# Patient Record
Sex: MALE | Race: WHITE | NOT HISPANIC OR LATINO | Employment: FULL TIME | URBAN - METROPOLITAN AREA
[De-identification: names, ages, dates, MRNs, and addresses within clinical notes are randomized per-mention and may not be internally consistent; named-entity substitution may affect disease eponyms.]

---

## 2024-04-13 ENCOUNTER — APPOINTMENT (OUTPATIENT)
Dept: CARDIOLOGY | Facility: HOSPITAL | Age: 44
End: 2024-04-13
Payer: COMMERCIAL

## 2024-04-13 ENCOUNTER — APPOINTMENT (OUTPATIENT)
Dept: RADIOLOGY | Facility: HOSPITAL | Age: 44
End: 2024-04-13
Payer: COMMERCIAL

## 2024-04-13 ENCOUNTER — HOSPITAL ENCOUNTER (EMERGENCY)
Facility: HOSPITAL | Age: 44
Discharge: HOME | End: 2024-04-13
Attending: STUDENT IN AN ORGANIZED HEALTH CARE EDUCATION/TRAINING PROGRAM
Payer: COMMERCIAL

## 2024-04-13 VITALS
TEMPERATURE: 96.8 F | DIASTOLIC BLOOD PRESSURE: 98 MMHG | SYSTOLIC BLOOD PRESSURE: 140 MMHG | HEIGHT: 75 IN | OXYGEN SATURATION: 100 % | BODY MASS INDEX: 36.06 KG/M2 | HEART RATE: 79 BPM | WEIGHT: 290 LBS | RESPIRATION RATE: 18 BRPM

## 2024-04-13 DIAGNOSIS — R07.9 CHEST PAIN, UNSPECIFIED TYPE: Primary | ICD-10-CM

## 2024-04-13 LAB
ANION GAP SERPL CALC-SCNC: 12 MMOL/L
BASOPHILS # BLD AUTO: 0.07 X10*3/UL (ref 0–0.1)
BASOPHILS NFR BLD AUTO: 1.1 %
BUN SERPL-MCNC: 19 MG/DL (ref 8–25)
CALCIUM SERPL-MCNC: 8.9 MG/DL (ref 8.5–10.4)
CHLORIDE SERPL-SCNC: 100 MMOL/L (ref 97–107)
CO2 SERPL-SCNC: 27 MMOL/L (ref 24–31)
CREAT SERPL-MCNC: 1.4 MG/DL (ref 0.4–1.6)
EGFRCR SERPLBLD CKD-EPI 2021: 63 ML/MIN/1.73M*2
EOSINOPHIL # BLD AUTO: 0.42 X10*3/UL (ref 0–0.7)
EOSINOPHIL NFR BLD AUTO: 6.4 %
ERYTHROCYTE [DISTWIDTH] IN BLOOD BY AUTOMATED COUNT: 12.6 % (ref 11.5–14.5)
GLUCOSE SERPL-MCNC: 110 MG/DL (ref 65–99)
HCT VFR BLD AUTO: 44.5 % (ref 41–52)
HGB BLD-MCNC: 15.3 G/DL (ref 13.5–17.5)
IMM GRANULOCYTES # BLD AUTO: 0.04 X10*3/UL (ref 0–0.7)
IMM GRANULOCYTES NFR BLD AUTO: 0.6 % (ref 0–0.9)
LYMPHOCYTES # BLD AUTO: 2.03 X10*3/UL (ref 1.2–4.8)
LYMPHOCYTES NFR BLD AUTO: 30.9 %
MCH RBC QN AUTO: 29.3 PG (ref 26–34)
MCHC RBC AUTO-ENTMCNC: 34.4 G/DL (ref 32–36)
MCV RBC AUTO: 85 FL (ref 80–100)
MONOCYTES # BLD AUTO: 0.57 X10*3/UL (ref 0.1–1)
MONOCYTES NFR BLD AUTO: 8.7 %
NEUTROPHILS # BLD AUTO: 3.45 X10*3/UL (ref 1.2–7.7)
NEUTROPHILS NFR BLD AUTO: 52.3 %
NRBC BLD-RTO: 0 /100 WBCS (ref 0–0)
PLATELET # BLD AUTO: 247 X10*3/UL (ref 150–450)
POTASSIUM SERPL-SCNC: 3.7 MMOL/L (ref 3.4–5.1)
RBC # BLD AUTO: 5.22 X10*6/UL (ref 4.5–5.9)
SODIUM SERPL-SCNC: 139 MMOL/L (ref 133–145)
TROPONIN T SERPL-MCNC: 7 NG/L
WBC # BLD AUTO: 6.6 X10*3/UL (ref 4.4–11.3)

## 2024-04-13 PROCEDURE — 36415 COLL VENOUS BLD VENIPUNCTURE: CPT | Performed by: STUDENT IN AN ORGANIZED HEALTH CARE EDUCATION/TRAINING PROGRAM

## 2024-04-13 PROCEDURE — 99283 EMERGENCY DEPT VISIT LOW MDM: CPT | Mod: 25

## 2024-04-13 PROCEDURE — 84484 ASSAY OF TROPONIN QUANT: CPT | Performed by: STUDENT IN AN ORGANIZED HEALTH CARE EDUCATION/TRAINING PROGRAM

## 2024-04-13 PROCEDURE — 71045 X-RAY EXAM CHEST 1 VIEW: CPT

## 2024-04-13 PROCEDURE — 80048 BASIC METABOLIC PNL TOTAL CA: CPT | Performed by: STUDENT IN AN ORGANIZED HEALTH CARE EDUCATION/TRAINING PROGRAM

## 2024-04-13 PROCEDURE — 85025 COMPLETE CBC W/AUTO DIFF WBC: CPT | Performed by: STUDENT IN AN ORGANIZED HEALTH CARE EDUCATION/TRAINING PROGRAM

## 2024-04-13 PROCEDURE — 93005 ELECTROCARDIOGRAM TRACING: CPT

## 2024-04-13 PROCEDURE — 71045 X-RAY EXAM CHEST 1 VIEW: CPT | Performed by: SURGERY

## 2024-04-13 ASSESSMENT — PAIN DESCRIPTION - LOCATION: LOCATION: CHEST

## 2024-04-13 ASSESSMENT — PAIN - FUNCTIONAL ASSESSMENT: PAIN_FUNCTIONAL_ASSESSMENT: 0-10

## 2024-04-13 ASSESSMENT — LIFESTYLE VARIABLES
EVER HAD A DRINK FIRST THING IN THE MORNING TO STEADY YOUR NERVES TO GET RID OF A HANGOVER: NO
HAVE PEOPLE ANNOYED YOU BY CRITICIZING YOUR DRINKING: NO
TOTAL SCORE: 0
HAVE YOU EVER FELT YOU SHOULD CUT DOWN ON YOUR DRINKING: NO
EVER FELT BAD OR GUILTY ABOUT YOUR DRINKING: NO

## 2024-04-13 ASSESSMENT — PAIN SCALES - GENERAL: PAINLEVEL_OUTOF10: 1

## 2024-04-13 NOTE — ED PROVIDER NOTES
HPI   Chief Complaint   Patient presents with    Chest Pain     Pt has very non descript chest discomfort. Pt stated laying down makes it feel better. Pt also stated he had taken Prilosec and felt better. Pt has no radiating pain and thinks it may even be a panic attack. 12 lead done 0316       HPI  See [facility] [place]                  No data recorded                   Patient History   History reviewed. No pertinent past medical history.  History reviewed. No pertinent surgical history.  No family history on file.  Social History     Tobacco Use    Smoking status: Never    Smokeless tobacco: Never   Substance Use Topics    Alcohol use: Not Currently    Drug use: Never       Physical Exam   ED Triage Vitals [04/13/24 0307]   Temperature Heart Rate Respirations BP   36 °C (96.8 °F) 79 18 (!) 140/98      Pulse Ox Temp Source Heart Rate Source Patient Position   100 % Tympanic -- --      BP Location FiO2 (%)     -- --       Physical Exam  See [facility] [place]  ED Course & [facility] [place]   ED Course as of 04/13/24 0436   Sat Apr 13, 2024 0318 EKG presented to me at 3:18 AM     EKG as interpreted by me shows normal sinus rhythm at a ventricular rate of 69 bpm, right bundle branch block, no STEMI.   [DH]      ED Course User Index  [DH] Gt Rodríguez MD         Diagnoses as of 04/13/24 0436   Chest pain, unspecified type       Medical Decision Making  44-year-old male with no significant past medical history who presents emergency room with chest pain.  Patient notes that he first noticed it 8 days ago on Friday and he presented to outside urgent care and was prescribed Prilosec after a normal chest x-ray and EKG.  Patient was noting some improvement of his discomfort but yesterday started noticing similar symptoms most notably after eating.  Patient notes he has been walking a few miles a day and does not note any exertional symptoms and otherwise has lost weight recently.  He otherwise denies any shortness of breath, abdominal pain, diarrhea, diaphoresis,  radiation of his pain and does note improvement of his discomfort after taking Tums.    ED Triage Vitals [04/13/24 0307]   Temperature Heart Rate Respirations BP   36 °C (96.8 °F) 79 18 (!) 140/98      Pulse Ox Temp Source Heart Rate Source Patient Position   100 % Tympanic -- --      BP Location FiO2 (%)     -- --       Vital signs reviewed: Afebrile, nontachycardic, mildly hypertensive, 100% on room air    Exam     Constitutional: No acute distress. Resting comfortably.   Head: Normocephalic, atraumatic.   Eyes: Pupils equal bilaterally, EOM grossly intact, conjunctiva normal.  Mouth/Throat: Oropharynx is clear, moist mucus membranes.   Neck: Supple. No lymphadenopathy.  Cardiovascular: Regular rate and regular rhythm. Extremities are well-perfused.   Pulmonary/Chest: No respiratory distress, breathing comfortably on room air.    Abdominal: Soft, non-tender, non-distended. No rebound or guarding.   Musculoskeletal: No lower extremity edema.       Skin: Warm, dry, and intact.   Neurological: Patient is oriented to person, place, time, and situation. Face symmetric, hearing intact to voice, speech normal. Moves all extremities.     Differential includes but is not limited to:  ACS versus pneumonia versus pneumothorax versus viral illness versus electrolyte abnormality    Amount and/or Complexity of Data Reviewed  External Data Reviewed: Prior EKG 6/30/2023 with unchanged sinus rhythm with right bundle branch block with similar T wave inversions noted in V1 and V3 .      Labs: ordered.  Labs Reviewed   BASIC METABOLIC PANEL - Abnormal       Result Value    Glucose 110 (*)     Sodium 139      Potassium 3.7      Chloride 100      Bicarbonate 27      Urea Nitrogen 19      Creatinine 1.40      eGFR 63      Calcium 8.9      Anion Gap 12     SERIAL TROPONIN, INITIAL ([facility]) - Normal    Troponin T, High Sensitivity 7     CBC WITH AUTO DIFFERENTIAL    WBC 6.6      nRBC 0.0      RBC 5.22      Hemoglobin 15.3      Hematocrit  44.5      MCV 85      MCH 29.3      MCHC 34.4      RDW 12.6      Platelets 247      Neutrophils % 52.3      Immature Granulocytes %, Automated 0.6      Lymphocytes % 30.9      Monocytes % 8.7      Eosinophils % 6.4      Basophils % 1.1      Neutrophils Absolute 3.45      Immature Granulocytes Absolute, Automated 0.04      Lymphocytes Absolute 2.03      Monocytes Absolute 0.57      Eosinophils Absolute 0.42      Basophils Absolute 0.07     TROPONIN T SERIES, HIGH SENSITIVITY (0, 2 HR, 6 HR)    Narrative:     The following orders were created for panel order Troponin T Series, High Sensitivity (0, 2HR, 6HR).  Procedure                               Abnormality         Status                     ---------                               -----------         ------                     Serial Troponin, Initial...[[id number]]  Normal              Final result               Serial Troponin, 2 Hour ...[[id number]]                                                   Please view results for these tests on the individual orders.   SERIAL TROPONIN,  2 HOUR ([facility])       Radiology: ordered and independent interpretation performed.  Xray(s) chest x-ray as interpreted by me shows no large pneumothorax at this time.    ECG/medicine tests: ordered and independent interpretation performed.  ED Course as of 04/13/24 0436   Sat Apr 13, 2024   0318 EKG presented to me at 3:18 AM     EKG as interpreted by me shows normal sinus rhythm at a ventricular rate of 69 bpm, right bundle branch block, no STEMI.   [DH]      ED Course User Index  [DH] Gt Rodríguez MD         Diagnoses as of 04/13/24 0436   Chest pain, unspecified type     Lab work is interpreted by me shows no significant leukocytosis or anemia and CBC and otherwise basic metabolic panel within normal limits with no significant electrolyte abnormality, or [facility].  Initial troponin is negative at this time and given patient's several day history of pain with no significant change in character no  indication at this time for repeat or delta.  Chest x-ray per radiology with no acute findings per radiology.    Patient otherwise with heart score of 1 and stable EKG with no evidence of acute ischemia and will otherwise follow-up outpatient with his primary care provider.    PLAN AND FOLLOW-UP: Patient counseled on all findings, diagnosis and treatment plan. Patient's questions and concerns addressed. Patient stable, discharged with instructions to follow up with PMD, and to return to ED at any time for worsening symptoms or any other concerns. Patient demonstrates understanding of the findings and the importance of appropriate follow up care.    ED Medications managed:    Medications - No data to display    Prescription drugs considered:    Antibiotics patient with no evidence of acute bacterial infection at this time    PATIENT REFERRED TO:  No follow-up provider specified.    DISCHARGE MEDICATIONS:  New Prescriptions    No medications on file       Gt Rodríguez MD  4:36 AM    Attending Emergency Physician  [facility] EMERGENCY MEDICINE            Procedure  Procedures     Gt Rodríguez MD  04/13/24 0436

## 2024-04-16 LAB
ATRIAL RATE: 69 BPM
P AXIS: 64 DEGREES
P OFFSET: 207 MS
P ONSET: 143 MS
PR INTERVAL: 160 MS
Q ONSET: 223 MS
QRS COUNT: 11 BEATS
QRS DURATION: 148 MS
QT INTERVAL: 442 MS
QTC CALCULATION(BAZETT): 473 MS
QTC FREDERICIA: 463 MS
R AXIS: 103 DEGREES
T AXIS: 32 DEGREES
T OFFSET: 444 MS
VENTRICULAR RATE: 69 BPM